# Patient Record
Sex: FEMALE | Race: WHITE
[De-identification: names, ages, dates, MRNs, and addresses within clinical notes are randomized per-mention and may not be internally consistent; named-entity substitution may affect disease eponyms.]

---

## 2018-03-16 ENCOUNTER — HOSPITAL ENCOUNTER (OUTPATIENT)
Dept: HOSPITAL 62 - LC | Age: 23
Discharge: HOME | End: 2018-03-16
Attending: OBSTETRICS & GYNECOLOGY
Payer: OTHER GOVERNMENT

## 2018-03-16 DIAGNOSIS — O47.03: Primary | ICD-10-CM

## 2018-03-16 DIAGNOSIS — Z3A.31: ICD-10-CM

## 2018-03-16 DIAGNOSIS — Z79.899: ICD-10-CM

## 2018-03-16 LAB
APPEARANCE UR: CLEAR
APTT PPP: COLORLESS S
BARBITURATES UR QL SCN: NEGATIVE
BILIRUB UR QL STRIP: NEGATIVE
GLUCOSE UR STRIP-MCNC: NEGATIVE MG/DL
KETONES UR STRIP-MCNC: NEGATIVE MG/DL
METHADONE UR QL SCN: NEGATIVE
NITRITE UR QL STRIP: NEGATIVE
PCP UR QL SCN: NEGATIVE
PH UR STRIP: 7 [PH] (ref 5–9)
PROT UR STRIP-MCNC: NEGATIVE MG/DL
SP GR UR STRIP: 1
URINE AMPHETAMINES SCREEN: NEGATIVE
URINE BENZODIAZEPINES SCREEN: NEGATIVE
URINE COCAINE SCREEN: NEGATIVE
URINE MARIJUANA (THC) SCREEN: NEGATIVE
UROBILINOGEN UR-MCNC: NEGATIVE MG/DL (ref ?–2)

## 2018-03-16 PROCEDURE — 81001 URINALYSIS AUTO W/SCOPE: CPT

## 2018-03-16 PROCEDURE — 80307 DRUG TEST PRSMV CHEM ANLYZR: CPT

## 2018-03-16 PROCEDURE — 4A1HXCZ MONITORING OF PRODUCTS OF CONCEPTION, CARDIAC RATE, EXTERNAL APPROACH: ICD-10-PCS | Performed by: OBSTETRICS & GYNECOLOGY

## 2018-04-20 ENCOUNTER — HOSPITAL ENCOUNTER (OUTPATIENT)
Dept: HOSPITAL 62 - LC | Age: 23
End: 2018-04-20
Attending: OBSTETRICS & GYNECOLOGY
Payer: OTHER GOVERNMENT

## 2018-04-20 DIAGNOSIS — Z34.93: Primary | ICD-10-CM

## 2018-04-20 PROCEDURE — 59025 FETAL NON-STRESS TEST: CPT

## 2018-04-20 PROCEDURE — 4A1HXCZ MONITORING OF PRODUCTS OF CONCEPTION, CARDIAC RATE, EXTERNAL APPROACH: ICD-10-PCS | Performed by: OBSTETRICS & GYNECOLOGY

## 2018-04-20 NOTE — NON STRESS TEST REPORT
=================================================================

Non Stress Test

=================================================================

Datetime Report Generated by CPN: 04/20/2018 11:13

   

   

=================================================================

DEMOGRAPHIC

=================================================================

   

EGA NST:  36.0

   

=================================================================

INDICATION

=================================================================

   

Indication for Study:  Ordered by Provider

   

=================================================================

MONITORING

=================================================================

   

Monitor Explained:  Monitor Explained; Test Explained; Patient

   Verbalized Understanding

Time on Monitor:  04/20/2018 10:09

Time off Monitor:  04/20/2018 11:12

NST Duration:  63

   

=================================================================

NST INTERVENTIONS

=================================================================

   

NST Interventions:  PO Hydration

BABY A:  [id number]

   

=================================================================

BABY A

=================================================================

   

Fetal Movement :  Present

Contraction Frequency :  rare

FHR Baseline :  120

Accelerations :  15X15

Decelerations :  None

Variability :  Moderate 6-25bpm

NST Review:  Meets Criteria for Reactive NST

NST Review and Verified By :  D Bellavance RNC

NST Results:  Reactive

   

=================================================================

NST REPORT

=================================================================

   

Report Trigger:  Send Report

## 2018-05-06 ENCOUNTER — HOSPITAL ENCOUNTER (OUTPATIENT)
Dept: HOSPITAL 62 - LC | Age: 23
Discharge: HOME | End: 2018-05-06
Attending: OBSTETRICS & GYNECOLOGY
Payer: OTHER GOVERNMENT

## 2018-05-06 DIAGNOSIS — Z3A.38: ICD-10-CM

## 2018-05-06 DIAGNOSIS — O47.1: Primary | ICD-10-CM

## 2018-05-06 LAB
APPEARANCE UR: CLEAR
APTT PPP: YELLOW S
BARBITURATES UR QL SCN: NEGATIVE
BILIRUB UR QL STRIP: NEGATIVE
GLUCOSE UR STRIP-MCNC: NEGATIVE MG/DL
KETONES UR STRIP-MCNC: NEGATIVE MG/DL
METHADONE UR QL SCN: NEGATIVE
NITRITE UR QL STRIP: NEGATIVE
PCP UR QL SCN: NEGATIVE
PH UR STRIP: 7 [PH] (ref 5–9)
PROT UR STRIP-MCNC: NEGATIVE MG/DL
SP GR UR STRIP: 1.01
URINE AMPHETAMINES SCREEN: NEGATIVE
URINE BENZODIAZEPINES SCREEN: NEGATIVE
URINE COCAINE SCREEN: NEGATIVE
URINE MARIJUANA (THC) SCREEN: NEGATIVE
UROBILINOGEN UR-MCNC: NEGATIVE MG/DL (ref ?–2)

## 2018-05-06 PROCEDURE — 80307 DRUG TEST PRSMV CHEM ANLYZR: CPT

## 2018-05-06 PROCEDURE — 82962 GLUCOSE BLOOD TEST: CPT

## 2018-05-06 PROCEDURE — 4A1HXCZ MONITORING OF PRODUCTS OF CONCEPTION, CARDIAC RATE, EXTERNAL APPROACH: ICD-10-PCS | Performed by: OBSTETRICS & GYNECOLOGY

## 2018-05-06 PROCEDURE — 81001 URINALYSIS AUTO W/SCOPE: CPT

## 2018-05-09 ENCOUNTER — HOSPITAL ENCOUNTER (OUTPATIENT)
Dept: HOSPITAL 62 - LC | Age: 23
Discharge: HOME | End: 2018-05-09
Attending: OBSTETRICS & GYNECOLOGY
Payer: OTHER GOVERNMENT

## 2018-05-09 DIAGNOSIS — Z34.93: Primary | ICD-10-CM

## 2018-05-09 PROCEDURE — 4A1HXCZ MONITORING OF PRODUCTS OF CONCEPTION, CARDIAC RATE, EXTERNAL APPROACH: ICD-10-PCS | Performed by: OBSTETRICS & GYNECOLOGY

## 2018-05-09 PROCEDURE — 59025 FETAL NON-STRESS TEST: CPT

## 2018-05-17 ENCOUNTER — HOSPITAL ENCOUNTER (OUTPATIENT)
Dept: HOSPITAL 62 - LC | Age: 23
Discharge: HOME | End: 2018-05-17
Attending: OBSTETRICS & GYNECOLOGY
Payer: OTHER GOVERNMENT

## 2018-05-17 DIAGNOSIS — O13.3: ICD-10-CM

## 2018-05-17 DIAGNOSIS — O24.419: Primary | ICD-10-CM

## 2018-05-17 DIAGNOSIS — Z3A.39: ICD-10-CM

## 2018-05-17 PROCEDURE — 59025 FETAL NON-STRESS TEST: CPT

## 2018-05-17 PROCEDURE — 4A1HXCZ MONITORING OF PRODUCTS OF CONCEPTION, CARDIAC RATE, EXTERNAL APPROACH: ICD-10-PCS | Performed by: OBSTETRICS & GYNECOLOGY

## 2018-05-17 NOTE — NON STRESS TEST REPORT
=================================================================

Non Stress Test

=================================================================

Datetime Report Generated by CPN: 05/17/2018 17:58

   

   

=================================================================

DEMOGRAPHIC

=================================================================

   

Test Number:  2

EGA NST:  39.6

EGA NST:  38.5

EGA NST:  38.2

   

=================================================================

INDICATION

=================================================================

   

Indication for Study:  Diabetes Mellitus

Indication for Study:  Gestational Hypertension

Indication for Study:  Ordered by Provider

   

=================================================================

VITAL SIGNS

=================================================================

   

Temperature - NST:  98.4

Temperature - NST:  98.6

Pulse - NST:  75

RESP - NST:  18

NBPSYS NST:  128

NBPSYS NST:  116

NBPDIA NST:  77

NBPDIA NST:  74

   

=================================================================

MONITORING

=================================================================

   

Monitor Explained:  Monitor Explained; Test Explained; Patient

   Verbalized Understanding

Monitor Explained:  Monitor Explained; Test Explained; Patient

   Verbalized Understanding

Monitor Explained:  Monitor Explained; Test Explained; Patient

   Verbalized Understanding

Time on Monitor:  05/17/2018 17:04

Time on Monitor:  05/09/2018 15:26

Time on Monitor:  05/06/2018 13:51

Time off Monitor:  05/17/2018 17:25

Time off Monitor:  05/09/2018 15:47

Time off Monitor:  05/06/2018 14:20

NST Duration:  21

NST Duration:  21

NST Duration:  29

   

=================================================================

NST INTERVENTIONS

=================================================================

   

NST Interventions:  None

NST Interventions:  PO Hydration

NST Interventions:  None

Physician Notified NST:  COCO Harris CNM

Physician Notified NST:  JOSÉ Short CNHARVEY

Physician Notified NST:  Dr. Dee

BABY A:  [id number]

   

=================================================================

BABY A

=================================================================

   

Fetal Movement :  Present

Contraction Frequency :  rare

Contraction Frequency :  none

Contraction Frequency :  Occ

FHR Baseline :  130

FHR Baseline :  130

FHR Baseline :  135

Accelerations :  15X15

Decelerations :  None

Variability :  Moderate 6-25bpm

NST Review:  Meets Criteria for Reactive NST

NST Review and Verified By :  DANIEL ABDULLAHI

NST Review and Verified By :  SEAMUS JI RN

NST Review and Verified By :  SUE Gutierrez RN

NST Results:  Reactive

   

=================================================================

NST REPORT

=================================================================

   

Report Trigger:  Send Report

## 2018-05-18 ENCOUNTER — HOSPITAL ENCOUNTER (OUTPATIENT)
Dept: HOSPITAL 62 - LC | Age: 23
LOS: 1 days | Discharge: HOME | End: 2018-05-19
Attending: OBSTETRICS & GYNECOLOGY
Payer: OTHER GOVERNMENT

## 2018-05-18 DIAGNOSIS — Z34.93: Primary | ICD-10-CM

## 2018-05-18 PROCEDURE — 4A1HXCZ MONITORING OF PRODUCTS OF CONCEPTION, CARDIAC RATE, EXTERNAL APPROACH: ICD-10-PCS | Performed by: OBSTETRICS & GYNECOLOGY

## 2018-05-18 PROCEDURE — 59025 FETAL NON-STRESS TEST: CPT

## 2018-05-18 PROCEDURE — 80307 DRUG TEST PRSMV CHEM ANLYZR: CPT

## 2018-05-18 PROCEDURE — 81005 URINALYSIS: CPT

## 2018-05-19 LAB
APPEARANCE UR: (no result)
APTT PPP: YELLOW S
BARBITURATES UR QL SCN: NEGATIVE
BILIRUB UR QL STRIP: NEGATIVE
GLUCOSE UR STRIP-MCNC: NEGATIVE MG/DL
KETONES UR STRIP-MCNC: NEGATIVE MG/DL
METHADONE UR QL SCN: NEGATIVE
NITRITE UR QL STRIP: NEGATIVE
PCP UR QL SCN: NEGATIVE
PH UR STRIP: 6 [PH] (ref 5–9)
PROT UR STRIP-MCNC: NEGATIVE MG/DL
SP GR UR STRIP: 1.01
URINE AMPHETAMINES SCREEN: NEGATIVE
URINE BENZODIAZEPINES SCREEN: NEGATIVE
URINE COCAINE SCREEN: NEGATIVE
URINE MARIJUANA (THC) SCREEN: NEGATIVE
UROBILINOGEN UR-MCNC: NEGATIVE MG/DL (ref ?–2)

## 2018-05-19 NOTE — NON STRESS TEST REPORT
=================================================================

Non Stress Test

=================================================================

Datetime Report Generated by CPN: 05/19/2018 00:49

   

   

=================================================================

DEMOGRAPHIC

=================================================================

   

EGA NST:  40.0

   

=================================================================

INDICATION

=================================================================

   

Indication for Study:  Ordered by Provider

   

=================================================================

URINE RESULTS

=================================================================

   

Urine Protein, NST:  Negative

Urine Ketones - NST:  Negative

Urine Glucose - NST:  Negative

Urine Blood - NST:  Negative

   

=================================================================

MONITORING

=================================================================

   

Monitor Explained:  Monitor Explained; Test Explained; Patient

   Verbalized Understanding

Time on Monitor:  05/18/2018 23:12

Time off Monitor:  05/19/2018 00:32

NST Duration:  80

   

=================================================================

NST INTERVENTIONS

=================================================================

   

NST Interventions:  PO Hydration

Physician Notified NST:  Dr. Little

BABY A:  [id number]

   

=================================================================

BABY A

=================================================================

   

Fetal Movement :  Present

Contraction Frequency :  Irregular

FHR Baseline :  125

Accelerations :  15X15

Decelerations :  None

Variability :  Moderate 6-25bpm

NST Review:  Meets Criteria for Reactive NST

NST Review and Verified By :  TORIE Snyder RN

NSCAESAR Results:  Reactive

   

=================================================================

NST REPORT

=================================================================

   

Report Trigger:  Send Report

## 2018-05-22 ENCOUNTER — HOSPITAL ENCOUNTER (INPATIENT)
Dept: HOSPITAL 62 - LR | Age: 23
LOS: 3 days | Discharge: HOME | End: 2018-05-25
Attending: OBSTETRICS & GYNECOLOGY | Admitting: OBSTETRICS & GYNECOLOGY
Payer: OTHER GOVERNMENT

## 2018-05-22 DIAGNOSIS — Z3A.40: ICD-10-CM

## 2018-05-22 DIAGNOSIS — F32.9: ICD-10-CM

## 2018-05-22 LAB
ADD MANUAL DIFF: NO
APPEARANCE UR: (no result)
APTT PPP: YELLOW S
BARBITURATES UR QL SCN: NEGATIVE
BASOPHILS # BLD AUTO: 0.1 10^3/UL (ref 0–0.2)
BASOPHILS NFR BLD AUTO: 0.5 % (ref 0–2)
BILIRUB UR QL STRIP: NEGATIVE
EOSINOPHIL # BLD AUTO: 0.1 10^3/UL (ref 0–0.6)
EOSINOPHIL NFR BLD AUTO: 0.7 % (ref 0–6)
ERYTHROCYTE [DISTWIDTH] IN BLOOD BY AUTOMATED COUNT: 15 % (ref 11.5–14)
GLUCOSE UR STRIP-MCNC: 50 MG/DL
HCT VFR BLD CALC: 38.4 % (ref 36–47)
HGB BLD-MCNC: 13 G/DL (ref 12–15.5)
KETONES UR STRIP-MCNC: NEGATIVE MG/DL
LYMPHOCYTES # BLD AUTO: 1.6 10^3/UL (ref 0.5–4.7)
LYMPHOCYTES NFR BLD AUTO: 13.3 % (ref 13–45)
MCH RBC QN AUTO: 31 PG (ref 27–33.4)
MCHC RBC AUTO-ENTMCNC: 34 G/DL (ref 32–36)
MCV RBC AUTO: 91 FL (ref 80–97)
METHADONE UR QL SCN: NEGATIVE
MONOCYTES # BLD AUTO: 0.6 10^3/UL (ref 0.1–1.4)
MONOCYTES NFR BLD AUTO: 5 % (ref 3–13)
NEUTROPHILS # BLD AUTO: 9.5 10^3/UL (ref 1.7–8.2)
NEUTS SEG NFR BLD AUTO: 80.5 % (ref 42–78)
NITRITE UR QL STRIP: NEGATIVE
PCP UR QL SCN: NEGATIVE
PH UR STRIP: 6 [PH] (ref 5–9)
PLATELET # BLD: 236 10^3/UL (ref 150–450)
PROT UR STRIP-MCNC: NEGATIVE MG/DL
RBC # BLD AUTO: 4.21 10^6/UL (ref 3.72–5.28)
SP GR UR STRIP: 1.02
TOTAL CELLS COUNTED % (AUTO): 100 %
URINE AMPHETAMINES SCREEN: NEGATIVE
URINE BENZODIAZEPINES SCREEN: NEGATIVE
URINE COCAINE SCREEN: NEGATIVE
URINE MARIJUANA (THC) SCREEN: NEGATIVE
UROBILINOGEN UR-MCNC: 2 MG/DL (ref ?–2)
WBC # BLD AUTO: 11.8 10^3/UL (ref 4–10.5)

## 2018-05-22 PROCEDURE — 36415 COLL VENOUS BLD VENIPUNCTURE: CPT

## 2018-05-22 PROCEDURE — 85025 COMPLETE CBC W/AUTO DIFF WBC: CPT

## 2018-05-22 PROCEDURE — 86850 RBC ANTIBODY SCREEN: CPT

## 2018-05-22 PROCEDURE — 80307 DRUG TEST PRSMV CHEM ANLYZR: CPT

## 2018-05-22 PROCEDURE — 86901 BLOOD TYPING SEROLOGIC RH(D): CPT

## 2018-05-22 PROCEDURE — 86900 BLOOD TYPING SEROLOGIC ABO: CPT

## 2018-05-22 PROCEDURE — 4A1HXCZ MONITORING OF PRODUCTS OF CONCEPTION, CARDIAC RATE, EXTERNAL APPROACH: ICD-10-PCS | Performed by: MIDWIFE

## 2018-05-22 PROCEDURE — 85027 COMPLETE CBC AUTOMATED: CPT

## 2018-05-22 PROCEDURE — 81001 URINALYSIS AUTO W/SCOPE: CPT

## 2018-05-22 PROCEDURE — 86592 SYPHILIS TEST NON-TREP QUAL: CPT

## 2018-05-22 NOTE — ADMISSION PHYSICAL
=================================================================



=================================================================

Datetime Report Generated by CPN: 2018 21:28

   

   

=================================================================

CURRENT ADMISSION

=================================================================

   

Chief Complaint:  Scheduled Induction of Labor

Indication for Induction:  Maternal Diabetes

Admit Impression :  Term, Intrauterine Pregnancy; No Active Labor;

   Intact Membranes; Induction of Labor

Admit Plan:  Admit to Unit; Initiate Labor Induction Protocol

   

=================================================================

ALLERGIES

=================================================================

   

Medication Allergies:  No

Medication Allergies:  No Known Allergies (2018)

Latex:  No Latex Allergies

Food Allergies:  none

Environmental Allergies:  none

   

=================================================================

OBSTETRICAL HISTORY

=================================================================

   

EDC:  2018 00:00

:  2

Para:  0

Term:  0

:  0

SAB:  0

IAB:  0

Ectopic:  0

Livin

Cesareans:  0

VBACs:  0

Multiple Births:  0

Gestational Diabetes:  Yes

Rh Sensitization:  No

Incompetent Cervix:  No

NAVEED:  No

Infertility:  No

ART Treatment:  No

Uterine Anomaly:  No

IUGR:  No

Hx Previous C/S:  No

Macrosomia:  No

Hx Loss/Stillborn:  No

PIH:  No

Hx  Death:  No

Placenta Previa/Abruption:  No

Depression/PP Depression:  Yes

PTL/PROM:  No

Post Partum Hemorrhage:  No

Current Pregnancy Procedures:  Ultrasound

Obstetrical History Comments:  2017 SAB 4-5weeks

   G2 - current, GDM, diet controlled 

   

=================================================================

***SEE PRENATAL RECORDS***

=================================================================

   

Alcohol:  No

Marijuana :  No

Cocaine:  No

Other Illicit Drugs:  No

Cigarettes:  Never Smoker. [id number]

   

=================================================================

MEDICAL HISTORY

=================================================================

   

Diabetes:  Yes

Diabetes Type:  Gestational Diabetes

Blood Transfusion:  No

Pulmonary Disease (Asthma, TB):  No

Breast Disease:  No

Hypertension:  No

Gyn Surgery:  No

Heart Disease:  No

Hosp/Surgery:  Yes

Autoimmune Disorder:  No

Anesthetic Complications:  No

Kidney Disease:  No

Abnormal Pap Smear:  No

Neuro/Epilepsy:  No

Psychiatric Disorders:  No

Other Medical Diseases:  No

Hepatitis/Liver Disease:  No

Significant Family History:  No

Varicosities/Phlebitis:  No

Trauma/Violence :  No

Thyroid Dysfunction:  No

Medical History Comments:  Hx of Suicide attempt in 2017 surgery on foot to remove infection

   

=================================================================

INFECTIOUS HISTORY

=================================================================

   

Gonorrhea:  No

Genital Herpes:  No

Chlamydia:  No

Tuberculosis:  No

Syphilis:  No

Hepatitis:  No

HIV/AIDS Exposure:  No

Rash or Viral Illness:  No

HPV:  No

   

=================================================================

PHYSICAL EXAM

=================================================================

   

General:  Normal

HEENT:  Normal

Neurologic:  Normal

Thyroid:  Deferred

Heart:  Normal

Lungs:  Normal

Breast:  Deferred

Back:  Normal

Abdomen:  Normal

Genitourinary Exam:  Normal

Extremities:  Normal

DTRs:  Normal

Pelvic Type:  Adequate

Vital Signs:  Reviewed

   

=================================================================

VAGINAL EXAM

=================================================================

   

Dilatation:  1

Effacement:  50

Station:  -2

Contraction Comments:  q 5-7

   

=================================================================

MEMBRANES

=================================================================

   

Membranes:  Intact

   

=================================================================

FETUS A

=================================================================

   

EGA:  40.4

Monitoring:  External US

FHR- Baseline:  120

Variability:  Moderate 6-25bpm

Accelerations:  15X15

Decelerations:  None

Fetal Presentation:  Vertex

Admit Comment:  23yo  at 40+4ega presents for IOL due to A1GDM

   (no logs on last several visits).  Cervix not favorable.  Bishops 5

   - and patient was counseled in office regarding.  Pelvis adequate

   for CATHY.  Reassuring FWB.  2 wk AC lag - monitor s/d ratios.  GBS

   negative.  Cervidil placed for cervical ripening.  Will likely need

   cooks catheter and pitocin in am.  Anticipate .  

   

=================================================================

PLANS FOR LABOR AND DELIVERY

=================================================================

   

Labor and Delivery:  None

Pain Management:  None

Other Pain Management Plans:  Undecided 

Feeding Preference:  Both

Benefit of Breast Feed Discussed:  Yes

Circumcision:  N/A

   

=================================================================

INFORMED CONSENT

=================================================================

   

Informed Consent Obtained:  Vaginal Delivery; Induction of Labor;

   Risks, Benefits and Alternatives Discussed

Signature:  Electronically signed by Amy Dee MD ([facility]) on

   2018 at 21:28  with User ID: [email or web address]

## 2018-05-23 PROCEDURE — 3E0P7VZ INTRODUCTION OF HORMONE INTO FEMALE REPRODUCTIVE, VIA NATURAL OR ARTIFICIAL OPENING: ICD-10-PCS | Performed by: MIDWIFE

## 2018-05-23 PROCEDURE — 3E033VJ INTRODUCTION OF OTHER HORMONE INTO PERIPHERAL VEIN, PERCUTANEOUS APPROACH: ICD-10-PCS | Performed by: MIDWIFE

## 2018-05-23 RX ADMIN — IBUPROFEN SCH MG: 800 TABLET, FILM COATED ORAL at 21:00

## 2018-05-23 RX ADMIN — ACETAMINOPHEN AND CODEINE PHOSPHATE PRN EACH: 300; 30 TABLET ORAL at 20:56

## 2018-05-23 RX ADMIN — FERROUS SULFATE TAB 325 MG (65 MG ELEMENTAL FE) SCH MG: 325 (65 FE) TAB at 18:25

## 2018-05-23 RX ADMIN — IBUPROFEN SCH MG: 800 TABLET, FILM COATED ORAL at 15:22

## 2018-05-23 RX ADMIN — DOCUSATE SODIUM SCH MG: 100 CAPSULE, LIQUID FILLED ORAL at 18:25

## 2018-05-23 NOTE — DELIVERY SUMMARY
=================================================================

Del Sum A-C

=================================================================

Datetime Report Generated by CPN: 2018 10:54

   

   

=================================================================

DELIVERY PERSONNEL

=================================================================

   

DELIVERY PERSONNEL:  [id number]

Delivery Doctor::  Carmen Higgins CNM

Labor and Delivery Nurse::  BRADLY Ventura

Labor and Delivery Nurse::  Mynor uD RN

Nursery Nurse::  BRADLY Gutierrez

Scrsabi Tech/CNA:  Tracy Maldonado, CNA II

   

=================================================================

MATERNAL INFORMATION

=================================================================

   

Delivery Anesthesia:  Epidural

Medications After Delivery:  Pitocin Bolus-Please Comment; Pitocin Drip

   20 Units/1000ml NSS

Provider Comments:   of viable female infant, head, shoulders, and

   body delivered without difficulty, infant with spontaneous cry and

   respirations, to maternal abdomen, cord, clamped X2 and infant cut

   free after 2 min delay. Spontaneous delivery of placenta appears

   intact, meconium stained, 3 VC, delivered via duncan mechanism.

   hemostasis acheived with external fundal massage and IV pitocin,

   mother and infant in stable condition, routine pp care. 

   

=================================================================

LABOR SUMMARY

=================================================================

   

EDC:  2018 00:00

No. Babies in Womb:  1

 Attempted:  No

Labor Anesthesia:  Epidural

   

=================================================================

LABOR INFORMATION

=================================================================

   

Onset of Labor:  2018 05:07

Complete Dilatation:  2018 07:47

Cervical Ripening Agents:  Cervidil

Oxytocin:  Induction

Group B Beta Strep:  Negative

Steroids Given:  None

Reason Steroids Not Administered:  Not Applicable

   

=================================================================

MEMBRANES

=================================================================

   

Membranes Rupture Method:  Spontaneous

Rupture of Membranes:  2018 05:07

Length of Rupture (hr):  3.47

Amniotic Fluid Color:  Moderate Meconium

Amniotic Fluid Amount:  Moderate

Amniotic Fluid Odor:  Normal

   

=================================================================

STAGES OF LABOR

=================================================================

   

Stage 1 hr:  2

Stage 1 min:  40

Stage 2 hr:  0

Stage 2 min:  48

Stage 3 hr:  0

Stage 3 min:  4

Total Time in Labor hr:  3

Total Time in Labor min:  32

   

=================================================================

VAGINAL DELIVERY

=================================================================

   

Episiotomy:  None

Laceration #1:  None

Laceration Repair:  Not Applicable

Laceration Repair Note:  none

Sponge Count Correct:  N/A

Sharps Count Correct:  N/A

   

=================================================================

CSECTION DELIVERY

=================================================================

   

Primary Indication:  N/A

Secondary Indication:  N/A

CSection Incidence:  N/A

Labor:  N/A

Elective:  N/A

CSection Incision:  N/A

   

=================================================================

BABY A INFORMATION

=================================================================

   

Infant Delivery Date/Time:  2018 08:35

Method of Delivery:  Vaginal

Born in Route :  No

:  N/A

Forceps:  N/A

Vacuum Extraction:  N/A

Shoulder Dystocia :  No

   

=================================================================

PRESENTATION/POSITION BABY A

=================================================================

   

Presentation:  Cephalic

Cephalic Presentation:  Vertex

Vertex Position:  Left Occipital Anterior

Breech Presentation:  N/A

   

=================================================================

PLACENTA INFORMATION BABY A

=================================================================

   

Placenta Delivery Time :  2018 08:39

Placenta Method of Delivery:  Spontaneous

Placenta Status:  Delivered

   

=================================================================

APGAR SCORES BABY A

=================================================================

   

Heart Rate 1 min:  >100 bpm

Resp Effort 1 min:  Good Cry

Reflex Irritability 1 min:  Cough or Sneeze or Pulls Away

Muscle Tone 1 min:  Active Motion

Color 1 min:  Blue/Pale

Resuscitation Effort 1 min:  N/A; Tactile Stimulation

APGAR SCORE 1 MIN:  8

Heart Rate 5 min:  >100 bpm

Resp Effort 5 min:  Good Cry

Reflex Irritability 5 min:  Cough or Sneeze or Pulls Away

Muscle Tone 5 min:  Active Motion

Color 5 min:  Body Pink, Extremities Blue

Resuscitation Effort 5 min:  N/A; Tactile Stimulation

APGAR SCORE 5 MIN:  9

Resuscitation Effort 10 min:  N/A

   

=================================================================

INFANT INFORMATION BABY A

=================================================================

   

Gestational Age at Delivery:  40.5

Gestational Status:  Full Term- 39- 40.6 Weeks

Infant Outcome :  Liveborn

Infant Condition :  Stable

Infant Sex:  Female

   

=================================================================

IDENTIFICATION BABY A

=================================================================

   

Infant Verification Date/Time:  2018 08:49

ID Band Number:  [id number]

Mother's Name Verified:  Yes

Infant Medical Record Number:  [id number]

RN Verifying Infant:  R German RN, D Bellavance RN

   

=================================================================

WEIGHT/LENGTH BABY A

=================================================================

   

Infant Birthweight (gm):  2940

Infant Weight (lb):  6

Infant Weight (oz):  8

Infant Length (in):  19.50

Infant Length (cm):  49.53

   

=================================================================

CORD INFORMATION BABY A

=================================================================

   

No. Cord Vessels:  3

Nuchal Cord :  N/A

Cord Blood Taken:  Yes-For Storage (Mom's Blood type +)

Infant Suction:  Mouth

   

=================================================================

ASSESSMENT BABY A

=================================================================

   

Infant Complications:  None

Physical Findings at Delivery:  Puncture Wound from Scalp Electrode

Infant Respirations:  Appears Normal

Skin to Skin:  Yes

Neonatologist/ALS Called :  No

Infant Care By:  D Bellavance RNC

Transferred To:  Remains with Mother

   

=================================================================

BABY B INFORMATION

=================================================================

   

 :  N/A

   

=================================================================

SIGNATURES

=================================================================

   

Assignment:  Davian Little MD

Signature:  Electronically signed by Carmen Higgins CNM on 2018 at

   08:49  with User ID: [email or web address]

:  Electronically signed by Carmen Higgins CNM on 2018 at 08:49 

   with User ID: [email or web address]

## 2018-05-23 NOTE — L&D PROGRESS NOTES
=================================================================

PROGRESS NOTES

=================================================================

Datetime Report Generated by CPN: 2018 04:34

   

   

=================================================================

PROGRESS NOTE

=================================================================

   

Impression:  Normal Progression of Labor

Plan:  Continue Present Management; Induction; Anticipate Vaginal

   Delivery

Informed Consent Obtained:  Vaginal Delivery; Induction of Labor;

   Risks, Benefits and Alternatives Discussed

Informed Consent Obtained:  Vaginal Delivery; Induction of Labor;

   Risks, Benefits and Alternatives Discussed

Comment:  Cervidil placed at 2100 and fell out at approx 0245.  Will

   begin pitocin Induction.  Bishops now 9 - favorable.  Nubain given

   for pain.  Epidural upon patient request.  Anticipate .  H/o

   suicide attempt will need discharge planner pp.

   

=================================================================

VAGINAL EXAM

=================================================================

   

Dilatation:  1

Effacement:  50

Station:  -2

Contractions:  q 5-7

   

=================================================================

MEMBRANES

=================================================================

   

Membranes:  Intact

Membranes:  Intact

   

=================================================================

FETUS A

=================================================================

   

FHR - Baseline:  125

Monitoring:  External US

Variability:  Moderate 6-25bpm

Accelerations:  15X15

Decelerations:  None

FHR Category:  Category I

Fetal Presentation:  Vertex

   

=================================================================

SIGNATURE

=================================================================

   

SIGNATURE:  [id number];[id number];13,[id number]

SIGNATURE:  13,[id number];14,[id number]

SIGNATURE:  [id number]

SIGNATURE:  [id number]

SIGNATURE:  [id number]

Signature:  Electronically signed by Amy Dee MD ([facility]) on

   2018 at 04:34  with User ID: [email or web address]

## 2018-05-24 LAB
ERYTHROCYTE [DISTWIDTH] IN BLOOD BY AUTOMATED COUNT: 15.4 % (ref 11.5–14)
HCT VFR BLD CALC: 34.7 % (ref 36–47)
HGB BLD-MCNC: 11.5 G/DL (ref 12–15.5)
MCH RBC QN AUTO: 30.6 PG (ref 27–33.4)
MCHC RBC AUTO-ENTMCNC: 33.3 G/DL (ref 32–36)
MCV RBC AUTO: 92 FL (ref 80–97)
PLATELET # BLD: 197 10^3/UL (ref 150–450)
RBC # BLD AUTO: 3.77 10^6/UL (ref 3.72–5.28)
WBC # BLD AUTO: 11.7 10^3/UL (ref 4–10.5)

## 2018-05-24 RX ADMIN — SENNOSIDES, DOCUSATE SODIUM SCH EACH: 50; 8.6 TABLET, FILM COATED ORAL at 09:17

## 2018-05-24 RX ADMIN — ACETAMINOPHEN AND CODEINE PHOSPHATE PRN EACH: 300; 30 TABLET ORAL at 22:05

## 2018-05-24 RX ADMIN — FERROUS SULFATE TAB 325 MG (65 MG ELEMENTAL FE) SCH MG: 325 (65 FE) TAB at 09:16

## 2018-05-24 RX ADMIN — DOCUSATE SODIUM SCH MG: 100 CAPSULE, LIQUID FILLED ORAL at 18:08

## 2018-05-24 RX ADMIN — Medication SCH CAP: at 09:15

## 2018-05-24 RX ADMIN — DOCUSATE SODIUM SCH MG: 100 CAPSULE, LIQUID FILLED ORAL at 09:16

## 2018-05-24 RX ADMIN — IBUPROFEN SCH MG: 800 TABLET, FILM COATED ORAL at 22:06

## 2018-05-24 RX ADMIN — FERROUS SULFATE TAB 325 MG (65 MG ELEMENTAL FE) SCH MG: 325 (65 FE) TAB at 18:08

## 2018-05-24 RX ADMIN — IBUPROFEN SCH MG: 800 TABLET, FILM COATED ORAL at 13:19

## 2018-05-24 RX ADMIN — IBUPROFEN SCH MG: 800 TABLET, FILM COATED ORAL at 06:59

## 2018-05-24 NOTE — PDOC PROGRESS REPORT
Subjective-OB


Progress Note for:: 18


Subjective: 





s/p  day #1


denies concern, lochia is stable, pain well controlled, voiding without 

difficulty.





Physical Exam (OB)


Vital Signs: 


 











Temp Pulse Resp BP Pulse Ox


 


 97.8 F   82   18   105/49 L  85 L


 


 18 07:37  18 07:37  18 07:37  18 07:37  18 07:37








 Intake & Output











 18





 06:59 06:59 06:59


 


Weight 71.5 kg  














- PIH/Pre-Eclampsia


DTR's: 2 +


Clonus: Negative


Headache: Absent


Epigastric Pain: No


Visual Changes: No





- Lochia


Lochia Amount: Scant < 10 ml


Lochia Color: Rubra/Red





- Abdomen


Description: Soft, Round


Hernia Present: No


Fundal Description: Firm


Fundal Height: u/u - u/2





Objective-Diagnostic


Laboratory: 


 





 18 07:06 





 











  18





  07:06


 


WBC  11.7 H


 


RBC  3.77


 


Hgb  11.5 L


 


Hct  34.7 L


 


MCV  92


 


MCH  30.6


 


MCHC  33.3


 


RDW  15.4 H


 


Plt Count  197














Assessment and Plan(PN)





- Assessment and Plan


(1) Vaginal delivery


Is this a current diagnosis for this admission?: Yes   


Plan: 


routine pp care








(2) Depression affecting pregnancy


Is this a current diagnosis for this admission?: Yes   


Plan: 


d/c planner








(3) Gestational diabetes mellitus (GDM) in childbirth, diet controlled


Is this a current diagnosis for this admission?: Yes   


Plan: 


yearly f/u








(4) H/O suicide attempt


Is this a current diagnosis for this admission?: Yes   


Plan: 


d/c planner








- Time Spent with Patient


Time with patient: Less than 15 minutes


Critical Time spent with patient: Less than 15 minutes


Medications reviewed and adjusted accordingly: Yes





- Disposition


Anticipated Discharge: Home


Within: within 24 hours

## 2018-05-25 VITALS — DIASTOLIC BLOOD PRESSURE: 49 MMHG | SYSTOLIC BLOOD PRESSURE: 112 MMHG

## 2018-05-25 RX ADMIN — Medication SCH CAP: at 09:56

## 2018-05-25 RX ADMIN — FERROUS SULFATE TAB 325 MG (65 MG ELEMENTAL FE) SCH MG: 325 (65 FE) TAB at 09:56

## 2018-05-25 RX ADMIN — IBUPROFEN SCH MG: 800 TABLET, FILM COATED ORAL at 06:23

## 2018-05-25 RX ADMIN — SENNOSIDES, DOCUSATE SODIUM SCH EACH: 50; 8.6 TABLET, FILM COATED ORAL at 09:56

## 2018-05-25 RX ADMIN — DOCUSATE SODIUM SCH MG: 100 CAPSULE, LIQUID FILLED ORAL at 09:56

## 2018-05-25 NOTE — PDOC DISCHARGE SUMMARY
Final Diagnosis


Discharge Date: 18





- Final Diagnosis


(1) Vaginal delivery


Is this a current diagnosis for this admission?: Yes   





(2) Depression affecting pregnancy


Is this a current diagnosis for this admission?: Yes   





(3) Gestational diabetes mellitus (GDM) in childbirth, diet controlled


Is this a current diagnosis for this admission?: Yes   





(4) H/O suicide attempt


Is this a current diagnosis for this admission?: Yes   





Discharge Data





- Discharge Medication


Home Medications: 








Pnv 102/Iron/Folate 1/Dss/Dha [Vitafol Fe+ Docusate Combo Pck] 1 tab PO DAILY  


Sertraline HCl [Zoloft 50 mg Tablet] 100 mg PO DAILY 18 








Gestational Age: 40+5


Reason(s) for Admission: Induction of Labor, Gestional Diabetes


Prenatal Procedures: NST, Ultrasound


Intrapartum Procedure(s): Spontaneous Vaginal Delivery





- [place] Data


  ** Baby 1 Female


Home with Mother: Yes


Complications: No





- Diagnosis Test


Laboratory: 


 











Temp Pulse Resp BP Pulse Ox


 


 98.0 F   65   16   112/49 L  99 


 


 18 07:50  18 07:50  18 19:44  18 07:50  18 07:50








 











  18





  18:31 18:58 07:06


 


RBC   4.21  3.77


 


Hgb   13.0  11.5 L


 


Hct   38.4  34.7 L


 


Urine Opiates Screen  NEGATIVE  














- Discharge information/Instructions


Discharge Activity: Activity As Tolerated, No Lifting Over 10 Pounds, No Lifting

/Push/Pulling, Pelvic Rest


Discharge Diet: As Tolerated, Regular


Disposition: HOME, SELF-CARE


Follow up with: [facility]


in: 2, Weeks - continue zoloft

## 2018-05-25 NOTE — PDOC PROGRESS REPORT
Subjective-OB


Progress Note for:: 05/25/18


Subjective: 





Doing well, no c/o, on Zoloft 100, no issues with depression or anxiety, breast 

feeding. eating well, mod lochia





Physical Exam (OB)


Vital Signs: 


 











Temp Pulse Resp BP Pulse Ox


 


 98.0 F   65   16   112/49 L  99 


 


 05/25/18 07:50  05/25/18 07:50  05/24/18 19:44  05/25/18 07:50  05/25/18 07:50








 Intake & Output











 05/24/18 05/25/18 05/26/18





 06:59 06:59 06:59


 


Intake Total  250 


 


Balance  250 














- PIH/Pre-Eclampsia


DTR's: 1 +


Clonus: Negative


Headache: Absent


Epigastric Pain: No


Visual Changes: No





- Lochia


Lochia Amount: Scant < 10 ml


Lochia Color: Rubra/Red





- Abdomen


Description: Soft, Round


Hernia Present: No


Fundal Description: Firm, Midline


Fundal Height: u/u - u/2





Objective-Diagnostic


Laboratory: 


 





 05/24/18 07:06 











Assessment and Plan(PN)





- Assessment and Plan


(1) Vaginal delivery


Is this a current diagnosis for this admission?: Yes   





(2) Depression affecting pregnancy


Is this a current diagnosis for this admission?: Yes   





(3) Gestational diabetes mellitus (GDM) in childbirth, diet controlled


Is this a current diagnosis for this admission?: Yes   





(4) H/O suicide attempt


Is this a current diagnosis for this admission?: Yes   





- Time Spent with Patient


Medications reviewed and adjusted accordingly: Yes





- Disposition


Anticipated Discharge: Home


Within: Other - home today